# Patient Record
(demographics unavailable — no encounter records)

---

## 2024-11-14 NOTE — CONSULT LETTER
[Today's Date] : [unfilled] [Dear  ___:] : Dear Dr. [unfilled]: [Consult - Single Provider] : Thank you very much for allowing me to participate in the care of this patient. If you have any questions, please do not hesitate to contact me. [Sincerely,] : Sincerely, [FreeTextEntry4] : Dr Sam Thurman [FreeTextEntry5] : [address] [facility]  [FreeTextEntry6] : [place] [id number] [de-identified] : Thank you for allowing me to participate in the care of this patient.  Please see my note for my assessment and plan and feel free to contact me if there are any questions or concerns. [de-identified] : Tyler Ruiz MD, MS, [facility] Congenital Interventional Cardiologist Director of Pediatric Cardiac Catheterization [facility]  of Pediatrics, [facility] [employer] at [facility] [place] [address] Tel: [phone] Fax: [phone]   [email or web address]

## 2024-11-14 NOTE — CONSULT LETTER
[Today's Date] : [unfilled] [Dear  ___:] : Dear Dr. [unfilled]: [Consult - Single Provider] : Thank you very much for allowing me to participate in the care of this patient. If you have any questions, please do not hesitate to contact me. [Sincerely,] : Sincerely, [FreeTextEntry4] : Dr Sam Thurman [FreeTextEntry5] : [address] [facility]  [FreeTextEntry6] : [place] [id number] [de-identified] : Thank you for allowing me to participate in the care of this patient.  Please see my note for my assessment and plan and feel free to contact me if there are any questions or concerns. [de-identified] : Tyler Ruiz MD, MS, [facility] Congenital Interventional Cardiologist Director of Pediatric Cardiac Catheterization [facility]  of Pediatrics, [facility] [employer] at [facility] [place] [address] Tel: [phone] Fax: [phone]   [email or web address]

## 2024-11-14 NOTE — DISCUSSION/SUMMARY
[May participate in all age-appropriate activities] : [unfilled] May participate in all age-appropriate activities. [FreeTextEntry1] : PROBLEM LIST: 1. Benign heart murmur. 2. Small ASD vs PFO.   In summary, JAZMINE is a 23-day-old male with a benign heart murmur which was not appreciated today.  The history, physical exam, EKG, and echocardiogram are reassuring.  He has an atrial septal communication that is somewhat more prominent than a typical PFO.  I discussed at length with the family that the murmur is not related to cardiac pathology and may get louder during times of illness or fever.  I will plan to see him in 6 months with an ecg and echocardiogram to follow up on the ASD.  In the interim, if there are any further questions, concerns or changes in his clinical status we would be happy to see him at that time.  The family was instructed to return if JAZMINE develops poor feeding, poor growth, cyanosis, respiratory distress, activity intolerance, syncope or any other concerning symptoms.  He does not require SBE prophylaxis or activity limitations.  The family expressed understanding of and agreement with the plan.  All questions were answered.   Thank you for allowing us to participate in the care of this patient.  Feel free to reach out to us with any further questions or concerns. [Needs SBE Prophylaxis] : [unfilled] does not need bacterial endocarditis prophylaxis

## 2024-11-14 NOTE — HISTORY OF PRESENT ILLNESS
[FreeTextEntry1] : I had the pleasure of seeing AJZMINE HERNANDEZ in the pediatric cardiology clinic of [facility] on Nov 13, 2024.  He was accompanied by his parents.  As you know, JAZMINE is a 22-day-old male who was referred for cardiology consultation due to a heart murmur.  The murmur was first diagnosed during a routine pediatric visit.  He had an unremarkable pregnancy and delivery.  He has been thriving at home, has been feeding without difficulty, has been gaining weight and developing appropriately.  There has been no tachypnea, increased work of breathing, cyanosis, excessive diaphoresis, unexplained irritability, or syncope.

## 2024-11-14 NOTE — REVIEW OF SYSTEMS
[___ Formula] : [unfilled] Formula  [___ ounces/feeding] : ~OBDULIA rodriguez/feeding [___ Times/day] : [unfilled] times/day [Nl] : no feeding issues at this time. [Acting Fussy] : not acting ~L fussy [Fever] : no fever [Wgt Loss (___ Lbs)] : no recent weight loss [Pallor] : not pale [Discharge] : no discharge [Redness] : no redness [Nasal Discharge] : no nasal discharge [Nasal Stuffiness] : no nasal congestion [Stridor] : no stridor [Cyanosis] : no cyanosis [Edema] : no edema [Diaphoresis] : not diaphoretic [Tachypnea] : not tachypneic [Wheezing] : no wheezing [Cough] : no cough [Being A Poor Eater] : not a poor eater [Vomiting] : no vomiting [Diarrhea] : no diarrhea [Decrease In Appetite] : appetite not decreased [Fainting (Syncope)] : no fainting [Dec Consciousness] :  no decrease in consciousness [Seizure] : no seizures [Hypotonicity (Flaccid)] : not hypotonic [Refusal to Bear Wgt] : normal weight bearing [Puffy Hands/Feet] : no hand/feet puffiness [Rash] : no rash [Hemangioma] : no hemangioma [Jaundice] : no jaundice [Wound problems] : no wound problems [Bruising] : no tendency for easy bruising [Swollen Glands] : no lymphadenopathy [Enlarged [place]] : the fontanelle was not enlarged [Hoarse Cry] : no hoarse cry [Failure To Thrive] : no failure to thrive [Penis Circumcised] : not circumcised [Undescended Testes] : no undescended testicle [Ambiguous Genitals] : genitals not ambiguous [Dec Urine Output] : no oliguria

## 2024-11-14 NOTE — CARDIOLOGY SUMMARY
[Today's Date] : [unfilled] [FreeTextEntry1] : Sinus rhythm at a rate of 160 beats per minute with a normal FL and QRS interval. There is no evidence of atrial enlargement, ventricular hypertrophy or pre-excitation.  The QRS axis is normal.  The QTc is within normal limits with no ST or T-wave changes. [FreeTextEntry2] : See report for details.  Briefly, small ASD vs PFO otherwise unremarkable.

## 2024-11-14 NOTE — CARDIOLOGY SUMMARY
[Today's Date] : [unfilled] [FreeTextEntry1] : Sinus rhythm at a rate of 160 beats per minute with a normal KS and QRS interval. There is no evidence of atrial enlargement, ventricular hypertrophy or pre-excitation.  The QRS axis is normal.  The QTc is within normal limits with no ST or T-wave changes. [FreeTextEntry2] : See report for details.  Briefly, small ASD vs PFO otherwise unremarkable.

## 2024-11-14 NOTE — HISTORY OF PRESENT ILLNESS
[FreeTextEntry1] : I had the pleasure of seeing JAZMINE HERNANDEZ in the pediatric cardiology clinic of [facility] on Nov 13, 2024.  He was accompanied by his parents.  As you know, JAZMINE is a 22-day-old male who was referred for cardiology consultation due to a heart murmur.  The murmur was first diagnosed during a routine pediatric visit.  He had an unremarkable pregnancy and delivery.  He has been thriving at home, has been feeding without difficulty, has been gaining weight and developing appropriately.  There has been no tachypnea, increased work of breathing, cyanosis, excessive diaphoresis, unexplained irritability, or syncope.

## 2024-11-14 NOTE — REASON FOR VISIT
[Initial Consultation] : an initial consultation for [Murmurs] : a murmur [Parents] : parents [FreeTextEntry3] : Cardiac Consult

## 2024-11-14 NOTE — REVIEW OF SYSTEMS
[___ Formula] : [unfilled] Formula  [___ ounces/feeding] : ~OBDULIA rodriguez/feeding [___ Times/day] : [unfilled] times/day [Nl] : no feeding issues at this time. [Acting Fussy] : not acting ~L fussy [Fever] : no fever [Wgt Loss (___ Lbs)] : no recent weight loss [Pallor] : not pale [Discharge] : no discharge [Redness] : no redness [Nasal Discharge] : no nasal discharge [Nasal Stuffiness] : no nasal congestion [Stridor] : no stridor [Cyanosis] : no cyanosis [Edema] : no edema [Diaphoresis] : not diaphoretic [Tachypnea] : not tachypneic [Wheezing] : no wheezing [Cough] : no cough [Being A Poor Eater] : not a poor eater [Vomiting] : no vomiting [Diarrhea] : no diarrhea [Decrease In Appetite] : appetite not decreased [Fainting (Syncope)] : no fainting [Dec Consciousness] :  no decrease in consciousness [Seizure] : no seizures [Hypotonicity (Flaccid)] : not hypotonic [Refusal to Bear Wgt] : normal weight bearing [Puffy Hands/Feet] : no hand/feet puffiness [Rash] : no rash [Hemangioma] : no hemangioma [Jaundice] : no jaundice [Wound problems] : no wound problems [Bruising] : no tendency for easy bruising [Swollen Glands] : no lymphadenopathy [Enlarged [place]] : the fontanelle was not enlarged [Hoarse Cry] : no hoarse cry [Failure To Thrive] : no failure to thrive [Penis Circumcised] : not circumcised [Undescended Testes] : no undescended testicle [Ambiguous Genitals] : genitals not ambiguous [Dec Urine Output] : no oliguria

## 2025-05-14 NOTE — REVIEW OF SYSTEMS
[___ Formula] : [unfilled] Formula  [___ ounces/feeding] : ~OBDULIA rodriguez/feeding [___ Times/day] : [unfilled] times/day [Acting Fussy] : not acting ~L fussy [Fever] : no fever [Wgt Loss (___ Lbs)] : no recent weight loss [Pallor] : not pale [Discharge] : no discharge [Redness] : no redness [Nasal Discharge] : no nasal discharge [Nasal Stuffiness] : no nasal congestion [Stridor] : no stridor [Cyanosis] : no cyanosis [Edema] : no edema [Diaphoresis] : not diaphoretic [Tachypnea] : not tachypneic [Wheezing] : no wheezing [Cough] : no cough [Being A Poor Eater] : not a poor eater [Vomiting] : no vomiting [Diarrhea] : no diarrhea [Decrease In Appetite] : appetite not decreased [Fainting (Syncope)] : no fainting [Dec Consciousness] :  no decrease in consciousness [Seizure] : no seizures [Hypotonicity (Flaccid)] : not hypotonic [Refusal to Bear Wgt] : normal weight bearing [Puffy Hands/Feet] : no hand/feet puffiness [Rash] : no rash [Hemangioma] : no hemangioma [Jaundice] : no jaundice [Wound problems] : no wound problems [Bruising] : no tendency for easy bruising [Swollen Glands] : no lymphadenopathy [Enlarged [place]] : the fontanelle was not enlarged [Hoarse Cry] : no hoarse cry [Failure To Thrive] : no failure to thrive [Penis Circumcised] : not circumcised [Undescended Testes] : no undescended testicle [Ambiguous Genitals] : genitals not ambiguous [Dec Urine Output] : no oliguria [Nl] : no feeding issues at this time. [Solid Foods] : No solid food at this time

## 2025-05-14 NOTE — HISTORY OF PRESENT ILLNESS
[FreeTextEntry1] : I had the pleasure of seeing JAZMINE HERNANDEZ in the pediatric cardiology clinic of [facility] on May 14, 2025.  He was accompanied by his mother.  As you know, JAZMINE is a 6-month-old male who was referred for cardiology consultation due to a heart murmur and was found to have a small ASD.  He was last seen by me on Nov 13, 2024.  Since that visit he has been well with no specific concerns.  He has been thriving at home, has been feeding without difficulty, has been gaining weight and developing appropriately.  There has been no tachypnea, increased work of breathing, cyanosis, excessive diaphoresis, unexplained irritability, or syncope.

## 2025-05-14 NOTE — CONSULT LETTER
[Today's Date] : [unfilled] [Name] : Name: [unfilled] [] : : ~~ [Today's Date:] : [unfilled] [Dear  ___:] : Dear Dr. [unfilled]: [Consult] : I had the pleasure of evaluating your patient, [unfilled]. My full evaluation follows. [Consult - Single Provider] : Thank you very much for allowing me to participate in the care of this patient. If you have any questions, please do not hesitate to contact me. [Sincerely,] : Sincerely, [FreeTextEntry4] : Dr Sam Thurman [FreeTextEntry5] : [address] [facility]  [FreeTextEntry6] : [place] [id number] [de-identified] : Thank you for allowing me to participate in the care of this patient.  Please see my note for my assessment and plan and feel free to contact me if there are any questions or concerns. [de-identified] : Tyler Ruiz MD, MS, [facility] Congenital Interventional Cardiologist Director of Pediatric Cardiac Catheterization [facility]  of Pediatrics, [facility] [employer] at [facility] [place] [address] Tel: [phone] Fax: [phone]   [email or web address]

## 2025-05-14 NOTE — CARDIOLOGY SUMMARY
[Today's Date] : [unfilled] [FreeTextEntry1] : Sinus rhythm at a rate of 162 beats per minute with a normal [place] and QRS interval.  There is borderline RAD.  There is no evidence of atrial enlargement, ventricular hypertrophy or pre-excitation.  The QTc is within normal limits with no ST or T-wave changes. [FreeTextEntry2] : See report for details.  Briefly, structurally normal heart.

## 2025-05-14 NOTE — DISCUSSION/SUMMARY
[FreeTextEntry1] : PROBLEM LIST: 1. Benign heart murmur, resolved. 2. Small ASD vs PFO, resolved.   In summary, JAZMINE is a 6-month-old male with a small atrial communication that is resolved on today's assessment.  The history, physical exam, EKG, and echocardiogram are reassuring.  His heart is structurally normal.  Routine cardiology follow-up is not indicated.  If there are any further questions, concerns or changes in his clinical status we would be happy to see him at that time.  The family was instructed to return if JAZMINE develops poor feeding, poor growth, cyanosis, respiratory distress, activity intolerance, syncope or any other concerning symptoms.  He does not require SBE prophylaxis or activity limitations.  The family expressed understanding of and agreement with the plan.  All questions were answered.   Thank you for allowing us to participate in the care of this patient.  Feel free to reach out to us with any further questions or concerns. [Needs SBE Prophylaxis] : [unfilled] does not need bacterial endocarditis prophylaxis [May participate in all age-appropriate activities] : [unfilled] May participate in all age-appropriate activities.